# Patient Record
Sex: FEMALE | Race: BLACK OR AFRICAN AMERICAN | NOT HISPANIC OR LATINO | URBAN - METROPOLITAN AREA
[De-identification: names, ages, dates, MRNs, and addresses within clinical notes are randomized per-mention and may not be internally consistent; named-entity substitution may affect disease eponyms.]

---

## 2020-01-22 ENCOUNTER — APPOINTMENT (RX ONLY)
Dept: URBAN - METROPOLITAN AREA CLINIC 110 | Facility: CLINIC | Age: 50
Setting detail: DERMATOLOGY
End: 2020-01-22

## 2020-01-22 DIAGNOSIS — L66.8 OTHER CICATRICIAL ALOPECIA: ICD-10-CM | Status: INADEQUATELY CONTROLLED

## 2020-01-22 DIAGNOSIS — L66.81 CENTRAL CENTRIFUGAL CICATRICIAL ALOPECIA: ICD-10-CM | Status: INADEQUATELY CONTROLLED

## 2020-01-22 PROCEDURE — ? FULL BODY SKIN EXAM - DECLINED

## 2020-01-22 PROCEDURE — ? INTRALESIONAL KENALOG

## 2020-01-22 PROCEDURE — ? COUNSELING

## 2020-01-22 PROCEDURE — ? PRESCRIPTION

## 2020-01-22 PROCEDURE — 11900 INJECT SKIN LESIONS </W 7: CPT

## 2020-01-22 RX ORDER — FLUOCINONIDE 0.5 MG/ML
SOLUTION TOPICAL
Qty: 1 | Refills: 3 | Status: ERX | COMMUNITY
Start: 2020-01-22

## 2020-01-22 RX ADMIN — FLUOCINONIDE: 0.5 SOLUTION TOPICAL at 00:00

## 2020-01-22 ASSESSMENT — LOCATION DETAILED DESCRIPTION DERM: LOCATION DETAILED: POSTERIOR MID-PARIETAL SCALP

## 2020-01-22 ASSESSMENT — LOCATION SIMPLE DESCRIPTION DERM: LOCATION SIMPLE: POSTERIOR SCALP

## 2020-01-22 ASSESSMENT — LOCATION ZONE DERM: LOCATION ZONE: SCALP

## 2020-01-22 NOTE — PROCEDURE: COUNSELING
Patient Specific Counseling (Will Not Stick From Patient To Patient): Patient reports being treated at an outside Dermatologist in [place], biopsy proven [facility]. Currently, requesting records.\\n\\nClinically it appears to be CCCA. She reports improvement in the past with response to topical fluocinolone and ILK. Decision today to do ILK and prescribed topical fluocinolone 0.05% solution QHS \\n\\nAlso, patient given information on Nutrafol. Patient Specific Counseling (Will Not Stick From Patient To Patient): Patient reports being treated at an outside Dermatologist in [place], biopsy proven Alopeica. Currently, requesting records.\\n\\nClinically it appears to be CCCA. She reports improvement in the past with response to topical fluocinolone and ILK. Decision today to do ILK and prescribed topical fluocinolone 0.05% solution QHS \\n\\nAlso, patient given information on Nutrafol.

## 2020-01-22 NOTE — PROCEDURE: INTRALESIONAL KENALOG
Medical Necessity Clause: This procedure was medically necessary because the lesions that were treated were:
Detail Level: Zone
Total Volume Injected (Ccs- Only Use Numbers And Decimals): 3.0
X Size Of Lesion In Cm (Optional): 0
Concentration Of Solution Injected (Mg/Ml): 5.0
Consent: The risks of atrophy were reviewed with the patient.
Include Z78.9 (Other Specified Conditions Influencing Health Status) As An Associated Diagnosis?: No
Kenalog Preparation: Kenalog

## 2020-02-25 ENCOUNTER — APPOINTMENT (RX ONLY)
Dept: URBAN - METROPOLITAN AREA CLINIC 110 | Facility: CLINIC | Age: 50
Setting detail: DERMATOLOGY
End: 2020-02-25

## 2020-02-25 DIAGNOSIS — L66.81 CENTRAL CENTRIFUGAL CICATRICIAL ALOPECIA: ICD-10-CM | Status: IMPROVED

## 2020-02-25 DIAGNOSIS — L66.8 OTHER CICATRICIAL ALOPECIA: ICD-10-CM | Status: IMPROVED

## 2020-02-25 PROCEDURE — 11900 INJECT SKIN LESIONS </W 7: CPT

## 2020-02-25 PROCEDURE — ? COUNSELING

## 2020-02-25 PROCEDURE — ? INTRALESIONAL KENALOG

## 2020-02-25 PROCEDURE — ? FULL BODY SKIN EXAM - DECLINED

## 2020-02-25 ASSESSMENT — LOCATION SIMPLE DESCRIPTION DERM: LOCATION SIMPLE: POSTERIOR SCALP

## 2020-02-25 ASSESSMENT — LOCATION ZONE DERM: LOCATION ZONE: SCALP

## 2020-02-25 ASSESSMENT — LOCATION DETAILED DESCRIPTION DERM: LOCATION DETAILED: POSTERIOR MID-PARIETAL SCALP

## 2020-02-25 NOTE — PROCEDURE: INTRALESIONAL KENALOG
Detail Level: Zone
Include Z78.9 (Other Specified Conditions Influencing Health Status) As An Associated Diagnosis?: No
Medical Necessity Clause: This procedure was medically necessary because the lesions that were treated were:
Consent: The risks of atrophy were reviewed with the patient.
Kenalog Preparation: Kenalog
Total Volume Injected (Ccs- Only Use Numbers And Decimals): 3.0
Concentration Of Solution Injected (Mg/Ml): 5.0
X Size Of Lesion In Cm (Optional): 0

## 2020-02-25 NOTE — PROCEDURE: MIPS QUALITY
Quality 226: Preventive Care And Screening: Tobacco Use: Screening And Cessation Intervention: Patient screened for tobacco use and is an ex/non-smoker
Quality 130: Documentation Of Current Medications In The Medical Record: Current Medications Documented
Quality 402: Tobacco Use And Help With Quitting Among Adolescents: Patient screened for tobacco and never smoked
Detail Level: Detailed
Quality 431: Preventive Care And Screening: Unhealthy Alcohol Use - Screening: Patient screened for unhealthy alcohol use using a single question and scores less than 2 times per year

## 2020-02-25 NOTE — PROCEDURE: COUNSELING
Detail Level: Zone
Patient Specific Counseling (Will Not Stick From Patient To Patient): .\\nBiopsy proven CCCA 9/2018. ILK performed today. Continue scalp solution. Return in 1 month

## 2020-02-25 NOTE — HPI: HAIR LOSS (CENTRAL CENTRIFUGAL CICATRICIAL ALOPECIA)
How Did The Hair Loss Occur?: gradual in onset
How Often Do You Shampoo Your Hair?: other
Is This A New Presentation, Or A Follow-Up?: Follow Up Central Centrifugal Cicatricial Alopecia

## 2020-07-20 ENCOUNTER — APPOINTMENT (RX ONLY)
Dept: URBAN - METROPOLITAN AREA CLINIC 110 | Facility: CLINIC | Age: 50
Setting detail: DERMATOLOGY
End: 2020-07-20

## 2020-07-20 DIAGNOSIS — L66.81 CENTRAL CENTRIFUGAL CICATRICIAL ALOPECIA: ICD-10-CM

## 2020-07-20 DIAGNOSIS — L85.3 XEROSIS CUTIS: ICD-10-CM

## 2020-07-20 DIAGNOSIS — L66.8 OTHER CICATRICIAL ALOPECIA: ICD-10-CM

## 2020-07-20 PROCEDURE — 99213 OFFICE O/P EST LOW 20 MIN: CPT | Mod: 25

## 2020-07-20 PROCEDURE — 11900 INJECT SKIN LESIONS </W 7: CPT

## 2020-07-20 PROCEDURE — ? INTRALESIONAL KENALOG

## 2020-07-20 PROCEDURE — ? FULL BODY SKIN EXAM - DECLINED

## 2020-07-20 PROCEDURE — ? ADDITIONAL NOTES

## 2020-07-20 PROCEDURE — ? PRESCRIPTION

## 2020-07-20 PROCEDURE — ? COUNSELING

## 2020-07-20 RX ORDER — FLUOCINONIDE 0.5 MG/ML
SOLUTION TOPICAL
Qty: 1 | Refills: 3 | Status: ERX

## 2020-07-20 ASSESSMENT — LOCATION SIMPLE DESCRIPTION DERM
LOCATION SIMPLE: POSTERIOR SCALP
LOCATION SIMPLE: RIGHT UPPER BACK

## 2020-07-20 ASSESSMENT — LOCATION ZONE DERM
LOCATION ZONE: SCALP
LOCATION ZONE: TRUNK

## 2020-07-20 ASSESSMENT — LOCATION DETAILED DESCRIPTION DERM
LOCATION DETAILED: POSTERIOR MID-PARIETAL SCALP
LOCATION DETAILED: RIGHT SUPERIOR MEDIAL UPPER BACK

## 2020-07-20 NOTE — PROCEDURE: ADDITIONAL NOTES
Detail Level: Simple
Additional Notes: Patient currently also using Nutrafol hair supplements and rogaine men’s formula twice a day. Improvement is seen in terms of hair growth. Encouraged her to continue ILK injections every month as well. Reminded patient to avoid any further hair chemical treatments and tight pulling of hair, Photos taken today to monitor progress.

## 2020-07-20 NOTE — PROCEDURE: COUNSELING
Detail Level: Zone
Patient Specific Counseling (Will Not Stick From Patient To Patient): .\\nBiopsy proven CCCA 9/2018. ILK performed today. Continue scalp solution. Return in 1 month
Detail Level: Detailed

## 2020-08-20 ENCOUNTER — APPOINTMENT (RX ONLY)
Dept: URBAN - METROPOLITAN AREA CLINIC 110 | Facility: CLINIC | Age: 50
Setting detail: DERMATOLOGY
End: 2020-08-20

## 2020-09-23 ENCOUNTER — APPOINTMENT (RX ONLY)
Dept: URBAN - METROPOLITAN AREA CLINIC 110 | Facility: CLINIC | Age: 50
Setting detail: DERMATOLOGY
End: 2020-09-23

## 2020-09-23 DIAGNOSIS — L66.81 CENTRAL CENTRIFUGAL CICATRICIAL ALOPECIA: ICD-10-CM

## 2020-09-23 DIAGNOSIS — L66.8 OTHER CICATRICIAL ALOPECIA: ICD-10-CM

## 2020-09-23 DIAGNOSIS — B35.1 TINEA UNGUIUM: ICD-10-CM

## 2020-09-23 PROCEDURE — ? INTRALESIONAL KENALOG

## 2020-09-23 PROCEDURE — ? PRESCRIPTION

## 2020-09-23 PROCEDURE — ? FULL BODY SKIN EXAM - DECLINED

## 2020-09-23 PROCEDURE — ? COUNSELING

## 2020-09-23 PROCEDURE — ? ADDITIONAL NOTES

## 2020-09-23 PROCEDURE — 99213 OFFICE O/P EST LOW 20 MIN: CPT | Mod: 25

## 2020-09-23 PROCEDURE — 11900 INJECT SKIN LESIONS </W 7: CPT

## 2020-09-23 RX ORDER — HALCINONIDE 1 MG/G
OINTMENT TOPICAL QDAY
Qty: 1 | Refills: 3 | Status: ERX | COMMUNITY
Start: 2020-09-23

## 2020-09-23 RX ORDER — DOXYCYCLINE HYCLATE 100 MG/1
TABLET, COATED ORAL QAM
Qty: 1 | Refills: 3 | Status: ERX | COMMUNITY
Start: 2020-09-23

## 2020-09-23 RX ADMIN — HALCINONIDE: 1 OINTMENT TOPICAL at 00:00

## 2020-09-23 RX ADMIN — DOXYCYCLINE HYCLATE: 100 TABLET, COATED ORAL at 00:00

## 2020-09-23 ASSESSMENT — LOCATION SIMPLE DESCRIPTION DERM
LOCATION SIMPLE: RIGHT FOREHEAD
LOCATION SIMPLE: LEFT SCALP
LOCATION SIMPLE: LEFT FOREHEAD
LOCATION SIMPLE: POSTERIOR SCALP
LOCATION SIMPLE: LEFT GREAT TOE
LOCATION SIMPLE: SCALP

## 2020-09-23 ASSESSMENT — LOCATION ZONE DERM
LOCATION ZONE: SCALP
LOCATION ZONE: FACE
LOCATION ZONE: TOENAIL

## 2020-09-23 ASSESSMENT — LOCATION DETAILED DESCRIPTION DERM
LOCATION DETAILED: LEFT GREAT TOENAIL
LOCATION DETAILED: LEFT MEDIAL FRONTAL SCALP
LOCATION DETAILED: LEFT SUPERIOR LATERAL FOREHEAD
LOCATION DETAILED: LEFT SUPERIOR PARIETAL SCALP
LOCATION DETAILED: POSTERIOR MID-PARIETAL SCALP
LOCATION DETAILED: RIGHT SUPERIOR FOREHEAD

## 2020-09-23 NOTE — PROCEDURE: ADDITIONAL NOTES
Detail Level: Simple
Additional Notes: Patient consent was obtained to proceed with the visit and recommended plan of care after discussion of all risks and benefits, including the risks of COVID-19 exposure.
Additional Notes: Biopsy proven CCCA in 2018. She is currently being treated with intralesional steroid every two months, last injection was in July 20, 2020. She was prescribed topical fluocinonide and using Rogaine but she stopped them because she had an episode of lower extremity swelling that she attributed to the. She classify disease severity as mild to moderate but she has noticed hair growth. Today administered intralesional Kenalog 5 mg/mL for a total of 2 cc. Will start doxycycline 100 mg every morning, was adviced about the most common side effects including headaches, G.I. upset, photosensitivity. He was told to use sunscreen daily, SPF 30 or more. I consider lower extremity swelling is not related to topical steroids as she was using it for long time. Will prescribe halcinonide topically once daily to affected areas and 1 cm around alopecia patch and advised to stop if swelling comes back and to call, other option is tacrolimus.  Advised to continue Rogaine 5% 1 cc twice daily to affected areas.
Additional Notes: Onychomycosis Is been treated by her primary care doctor with Jublia, advised to continue for three months or until clear

## 2020-09-24 ENCOUNTER — RX ONLY (OUTPATIENT)
Age: 50
Setting detail: RX ONLY
End: 2020-09-24

## 2020-09-24 RX ORDER — DOXYCYCLINE HYCLATE 100 MG/1
TABLET, COATED ORAL QAM
Qty: 30 | Refills: 3 | Status: ERX

## 2022-08-10 NOTE — PROCEDURE: INTRALESIONAL KENALOG
Medical Necessity Clause: This procedure was medically necessary because the lesions that were treated were:
Total Volume Injected (Ccs- Only Use Numbers And Decimals): 2
Include Z78.9 (Other Specified Conditions Influencing Health Status) As An Associated Diagnosis?: No
Concentration Of Solution Injected (Mg/Ml): 5.0
Kenalog Preparation: Kenalog
Detail Level: Detailed
X Size Of Lesion In Cm (Optional): 0
Consent: The risks of atrophy were reviewed with the patient.
no

## 2022-10-25 ENCOUNTER — APPOINTMENT (RX ONLY)
Dept: URBAN - METROPOLITAN AREA CLINIC 120 | Facility: CLINIC | Age: 52
Setting detail: DERMATOLOGY
End: 2022-10-25

## 2022-10-25 DIAGNOSIS — L68.0 HIRSUTISM: ICD-10-CM

## 2022-10-25 DIAGNOSIS — L66.81 CENTRAL CENTRIFUGAL CICATRICIAL ALOPECIA: ICD-10-CM | Status: WORSENING

## 2022-10-25 DIAGNOSIS — L66.8 OTHER CICATRICIAL ALOPECIA: ICD-10-CM | Status: WORSENING

## 2022-10-25 PROCEDURE — ? ORDER TESTS

## 2022-10-25 PROCEDURE — ? COUNSELING

## 2022-10-25 PROCEDURE — ? ADDITIONAL NOTES

## 2022-10-25 PROCEDURE — ? DEFER

## 2022-10-25 PROCEDURE — ? PRESCRIPTION

## 2022-10-25 PROCEDURE — 99214 OFFICE O/P EST MOD 30 MIN: CPT

## 2022-10-25 RX ORDER — MOMETASONE FUROATE 1 MG/ML
SOLUTION TOPICAL
Qty: 60 | Refills: 3 | Status: ERX | COMMUNITY
Start: 2022-10-25

## 2022-10-25 RX ADMIN — MOMETASONE FUROATE: 1 SOLUTION TOPICAL at 00:00

## 2022-10-25 ASSESSMENT — LOCATION ZONE DERM
LOCATION ZONE: FACE
LOCATION ZONE: SCALP

## 2022-10-25 ASSESSMENT — LOCATION SIMPLE DESCRIPTION DERM
LOCATION SIMPLE: POSTERIOR SCALP
LOCATION SIMPLE: SUBMENTAL CHIN

## 2022-10-25 ASSESSMENT — LOCATION DETAILED DESCRIPTION DERM
LOCATION DETAILED: SUBMENTAL CHIN
LOCATION DETAILED: POSTERIOR MID-PARIETAL SCALP

## 2022-10-25 NOTE — PROCEDURE: ORDER TESTS
Performing Laboratory: 0
Bill For Surgical Tray: no
Billing Type: Third-Party Bill
Expected Date Of Service: 10/25/2022

## 2022-10-25 NOTE — PROCEDURE: COUNSELING
Detail Level: Zone
Patient Specific Counseling (Will Not Stick From Patient To Patient): Biopsy proven CCCA 9/15/2008 Accession#[id number]
Detail Level: Detailed

## 2022-10-25 NOTE — PROCEDURE: ADDITIONAL NOTES
Detail Level: Simple
Additional Notes: Patient consent was obtained to proceed with the visit and recommended plan of care after discussion of all risks and benefits, including the risks of COVID-19 exposure.
Additional Notes: Will send for bloodwork and await the results.
Render Risk Assessment In Note?: no

## 2022-10-25 NOTE — PROCEDURE: DEFER
X Size Of Lesion In Cm (Optional): 0
Introduction Text (Please End With A Colon): The following procedure was deferred: GET P.A.
Detail Level: Detailed
Procedure To Be Performed At Next Visit: Intralesional Kenalog

## 2022-10-25 NOTE — HPI: HAIR LOSS
Previous Labs: Yes
How Did The Hair Loss Occur?: gradual in onset
How Severe Is Your Hair Loss?: moderate
When Were The Labs Drawn? (Drawn...): September 2022
What Hair Products Do You Use?: Biotin shampoo
Additional History: Patient has ILK Injections in 2020, improvement